# Patient Record
Sex: FEMALE | Race: WHITE | NOT HISPANIC OR LATINO | Employment: OTHER | URBAN - METROPOLITAN AREA
[De-identification: names, ages, dates, MRNs, and addresses within clinical notes are randomized per-mention and may not be internally consistent; named-entity substitution may affect disease eponyms.]

---

## 2023-06-08 PROBLEM — F41.1 GENERALIZED ANXIETY DISORDER: Status: ACTIVE | Noted: 2023-06-08

## 2023-07-17 PROBLEM — E78.2 HYPERLIPIDEMIA, MIXED: Status: ACTIVE | Noted: 2023-07-17

## 2023-09-12 ENCOUNTER — APPOINTMENT (OUTPATIENT)
Dept: PRIMARY CARE | Facility: CLINIC | Age: 73
End: 2023-09-12
Payer: MEDICARE

## 2023-10-02 ENCOUNTER — OFFICE VISIT (OUTPATIENT)
Dept: PRIMARY CARE | Facility: CLINIC | Age: 73
End: 2023-10-02
Payer: MEDICARE

## 2023-10-02 VITALS
SYSTOLIC BLOOD PRESSURE: 112 MMHG | OXYGEN SATURATION: 95 % | TEMPERATURE: 97.3 F | WEIGHT: 164 LBS | BODY MASS INDEX: 28 KG/M2 | HEART RATE: 92 BPM | HEIGHT: 64 IN | DIASTOLIC BLOOD PRESSURE: 70 MMHG

## 2023-10-02 DIAGNOSIS — E78.2 HYPERLIPIDEMIA, MIXED: ICD-10-CM

## 2023-10-02 DIAGNOSIS — I26.99 PULMONARY EMBOLISM, UNSPECIFIED CHRONICITY, UNSPECIFIED PULMONARY EMBOLISM TYPE, UNSPECIFIED WHETHER ACUTE COR PULMONALE PRESENT (MULTI): Primary | ICD-10-CM

## 2023-10-02 DIAGNOSIS — F32.A DEPRESSION, UNSPECIFIED DEPRESSION TYPE: ICD-10-CM

## 2023-10-02 DIAGNOSIS — D64.81 ANTINEOPLASTIC CHEMOTHERAPY INDUCED ANEMIA: ICD-10-CM

## 2023-10-02 DIAGNOSIS — G25.81 RESTLESS LEG SYNDROME: ICD-10-CM

## 2023-10-02 DIAGNOSIS — C85.10 B-CELL LYMPHOMA, UNSPECIFIED B-CELL LYMPHOMA TYPE, UNSPECIFIED BODY REGION (MULTI): ICD-10-CM

## 2023-10-02 DIAGNOSIS — D70.9 NEUTROPENIA, UNSPECIFIED TYPE (CMS-HCC): ICD-10-CM

## 2023-10-02 DIAGNOSIS — T45.1X5A ANTINEOPLASTIC CHEMOTHERAPY INDUCED ANEMIA: ICD-10-CM

## 2023-10-02 PROCEDURE — 1125F AMNT PAIN NOTED PAIN PRSNT: CPT | Performed by: INTERNAL MEDICINE

## 2023-10-02 PROCEDURE — 99214 OFFICE O/P EST MOD 30 MIN: CPT | Performed by: INTERNAL MEDICINE

## 2023-10-02 PROCEDURE — 1159F MED LIST DOCD IN RCRD: CPT | Performed by: INTERNAL MEDICINE

## 2023-10-02 NOTE — PROGRESS NOTES
"Subjective   Patient ID: Vianey Aguilar is a 73 y.o. female who presents for the following    Chronic disease follow up    MEDICARE WELLNESS Feb 2023    Assessment/Plan   b-cell lymphoma stage III: stable, following with dr jameson on a regular basis.      chemotherapy induced anemia and neutropenia: stable, receiving retacrit and neuclast      cancer related pain: Patient following with palliative care no longer getting percocet and a fentanyl patch      depression: stable on lexapro     Left hand deputryn contracture      RLS/neuropathy: stable, patient can take tylenol as needed. She never tried gabapentin       Irritable bowel syndrome : unclear origin if chemo induced vs irritable bowel. lets do \"align\" also just imodium as needed     hx of pulmonary embolism: stable, continue on eliquis        mammogram screening u       HPI   female here for the following      jose manuel and daughter is geena      b-cell lymphoma stage III. Patient has completed in september 2021 R-chop . she follows with dr jameson on a regular basis. right upper chest port, is now removed.      general swelling likely secondary to chemo. ef 65% september 2021      chemotherapy induced anemia and neutropenia: not requiring hormone treatment for this. Just watching.         mild depression: patient is on zoloft and xanax. she gets the refills by palliative care. she says that her anxiety does not get helped with xanax as it causes her to become tired. she was stopped on Zoloft and started patient on lexpro. she continues to do well.      diarrhea: unclear origin if chemo induced vs irritable bowel. she takes cholestyramine and imodium. she describes her stools normal in the am. but as day progresses she gets \"gas\" and pieces and water occurs. patient eating a lot of sauerkraut as a natural way to bulk her stool. patient tid see dr evangelista and is doing much better with cholestyramine METAMUCIL AND IMODIUM     RLS/neuropathy: pain in her lower " "extremities especially at night.     hx of Pulmonary embolism: after patient's third chemo patient was found to have a small blood clot. she is NO LONGER ON eliquis.     she likes a holistic and natural approach to her health care.      social: patient denies any smoking, drug or alcohol abuse     family history: brother alive and 84 yo, sister passed from breast cancer     surgical history: 2011 right ankle surgery fracture repair.        Visit Vitals  /70 (BP Location: Right arm, Patient Position: Sitting)   Pulse 92   Temp 36.3 °C (97.3 °F)   Ht 1.626 m (5' 4\")   Wt 74.4 kg (164 lb)   SpO2 95%   BMI 28.15 kg/m²   Smoking Status Never   BSA 1.83 m²     PHYSICAL EXAM   General appearance: Alert and in no acute distress. speech is clear and coherent  HEENT: Sclera and conjunctiva white, EOMI,     Respiratory : No respiratory distress, normal respiratory rhythm and effort. Clear bilateral breath sounds. No wheezing or rhonchi.   Cardiovascular: heart rate regular, S1, S2. no murmurs. no Lower extremity edema  Skin inspection: Normal skin color and pigmentation, normal skin turgor and no visible rash, induration, or cellulitis  MSK: 5/5 strength upper and lower extremities without gait abnormalities. no loss of muscle mass   Neuro: 2-12 CN grossly intact.  no slurred speech. no lateralizing deficit  Psychiatric Orientation: Oriented to person, place, and time. no depression, homicidal or suicidal thoughts, normal affect       REVIEW OF SYSTEMS   Constitutional: not feeling tired and no fever,    Cardiovascular: no exertional chest pain, no palpitations, no lower extremity edema    Lungs: Denies shortness of breath, exertional dyspnea, wheezing  Gastrointestinal: no change in bowel habits, no diarrhea, no nausea, no vomiting and no abdominal pain.      Neurological: no headaches, no seizures, no numbness, no lateralizing deficits and no fainting.   Psychiatric: no depression and no anxiety.   Urine: denies " polyuria, hematuria, dysuria        No Known Allergies    Current Outpatient Medications   Medication Sig Dispense Refill    cholestyramine (Questran) 4 gram packet MIX THE CONTENTS OF 2 POWDER PACKETS WITH 6 OUNCES OF NON CARBONATED BEVERAGE AND DRINK AND SWALLOW ONCE DAILY 180 packet 3    escitalopram (Lexapro) 10 mg tablet TAKE 1 TABLET DAILY 90 tablet 3     No current facility-administered medications for this visit.       Objective     No visits with results within 4 Month(s) from this visit.   Latest known visit with results is:   Legacy Encounter on 03/09/2022   Component Date Value Ref Range Status    Ova + Parasite Exam 03/09/2022 Negative  Negative Final    Giardia lamblia Antigen 03/09/2022 Negative  Negative Final    Cryptosporidum Antigen by EIA 03/09/2022 Negative  Negative Final       Radiology: Reviewed imaging in powerchart.  No results found.    No family history on file.  Social History     Socioeconomic History    Marital status:      Spouse name: Not on file    Number of children: Not on file    Years of education: Not on file    Highest education level: Not on file   Occupational History    Not on file   Tobacco Use    Smoking status: Never    Smokeless tobacco: Never   Substance and Sexual Activity    Alcohol use: Never    Drug use: Never    Sexual activity: Not on file   Other Topics Concern    Not on file   Social History Narrative    Not on file     Social Determinants of Health     Financial Resource Strain: Not on file   Food Insecurity: Not on file   Transportation Needs: Not on file   Physical Activity: Not on file   Stress: Not on file   Social Connections: Not on file   Intimate Partner Violence: Not on file   Housing Stability: Not on file     Past Medical History:   Diagnosis Date    Body mass index (BMI) 29.0-29.9, adult 11/12/2020    Body mass index (BMI) of 29.0 to 29.9 in adult    Body mass index (BMI)30.0-30.9, adult 12/05/2019    Body mass index (BMI) of 30.0 to 30.9 in  adult    Encounter for general adult medical examination without abnormal findings 11/12/2020    Medicare annual wellness visit, subsequent    Personal history of non-Hodgkin lymphomas 02/22/2022    History of non-Hodgkin's lymphoma    Personal history of other diseases of the nervous system and sense organs     History of carpal tunnel syndrome    Personal history of other diseases of urinary system     History of hematuria    Personal history of other malignant neoplasm of large intestine 02/22/2022    History of other malignant neoplasm of large intestine    Personal history of other malignant neoplasm of large intestine 02/22/2022    History of other malignant neoplasm of large intestine    Personal history of urinary calculi     Personal history of renal calculi    Procedure and treatment not carried out because of patient's decision for unspecified reasons 04/14/2021    Colonoscopy refused     Past Surgical History:   Procedure Laterality Date    ANKLE ARTHROSCOPY W/ OPEN REPAIR  08/24/2015    Ankle Repair    OTHER SURGICAL HISTORY  07/29/2021    Breast biopsy    OTHER SURGICAL HISTORY  07/29/2021    Colonoscopy       Charting was completed using voice recognition technology and may include unintended errors.

## 2023-11-01 LAB
NON-UH HIE CALCULATED LDL CHOLESTEROL: 104 MG/DL (ref 60–130)
NON-UH HIE CHOLESTEROL: 207 MG/DL (ref 100–200)
NON-UH HIE HDL CHOLESTEROL: 82 MG/DL (ref 40–60)
NON-UH HIE TOTAL CHOL/HDL CHOL RATIO: 2.5
NON-UH HIE TRIGLYCERIDES: 103 MG/DL (ref 30–150)
NON-UH HIE TSH: 2.31 UIU/ML (ref 0.55–4.78)

## 2024-04-10 ENCOUNTER — OFFICE VISIT (OUTPATIENT)
Dept: PRIMARY CARE | Facility: CLINIC | Age: 74
End: 2024-04-10
Payer: COMMERCIAL

## 2024-04-10 VITALS
OXYGEN SATURATION: 96 % | HEART RATE: 85 BPM | WEIGHT: 165 LBS | SYSTOLIC BLOOD PRESSURE: 160 MMHG | BODY MASS INDEX: 28.17 KG/M2 | DIASTOLIC BLOOD PRESSURE: 80 MMHG | HEIGHT: 64 IN

## 2024-04-10 DIAGNOSIS — Z00.00 WELLNESS EXAMINATION: Primary | ICD-10-CM

## 2024-04-10 DIAGNOSIS — D70.9 NEUTROPENIA, UNSPECIFIED TYPE (CMS-HCC): ICD-10-CM

## 2024-04-10 DIAGNOSIS — F33.0 MAJOR DEPRESSIVE DISORDER, RECURRENT, MILD (CMS-HCC): ICD-10-CM

## 2024-04-10 DIAGNOSIS — G62.2 POLYNEUROPATHY DUE TO OTHER TOXIC AGENTS (MULTI): ICD-10-CM

## 2024-04-10 DIAGNOSIS — C85.10 B-CELL LYMPHOMA, UNSPECIFIED B-CELL LYMPHOMA TYPE, UNSPECIFIED BODY REGION (MULTI): ICD-10-CM

## 2024-04-10 DIAGNOSIS — E43 UNSPECIFIED SEVERE PROTEIN-CALORIE MALNUTRITION (MULTI): ICD-10-CM

## 2024-04-10 DIAGNOSIS — I26.99 PULMONARY EMBOLISM, UNSPECIFIED CHRONICITY, UNSPECIFIED PULMONARY EMBOLISM TYPE, UNSPECIFIED WHETHER ACUTE COR PULMONALE PRESENT (MULTI): ICD-10-CM

## 2024-04-10 PROCEDURE — 99214 OFFICE O/P EST MOD 30 MIN: CPT | Performed by: INTERNAL MEDICINE

## 2024-04-10 PROCEDURE — G0439 PPPS, SUBSEQ VISIT: HCPCS | Performed by: INTERNAL MEDICINE

## 2024-04-10 PROCEDURE — 1170F FXNL STATUS ASSESSED: CPT | Performed by: INTERNAL MEDICINE

## 2024-04-10 PROCEDURE — 1036F TOBACCO NON-USER: CPT | Performed by: INTERNAL MEDICINE

## 2024-04-10 PROCEDURE — 1160F RVW MEDS BY RX/DR IN RCRD: CPT | Performed by: INTERNAL MEDICINE

## 2024-04-10 PROCEDURE — 1159F MED LIST DOCD IN RCRD: CPT | Performed by: INTERNAL MEDICINE

## 2024-04-10 ASSESSMENT — PATIENT HEALTH QUESTIONNAIRE - PHQ9
SUM OF ALL RESPONSES TO PHQ9 QUESTIONS 1 AND 2: 0
2. FEELING DOWN, DEPRESSED OR HOPELESS: NOT AT ALL
1. LITTLE INTEREST OR PLEASURE IN DOING THINGS: NOT AT ALL

## 2024-04-10 ASSESSMENT — ACTIVITIES OF DAILY LIVING (ADL)
DRESSING: INDEPENDENT
BATHING: INDEPENDENT
MANAGING_FINANCES: INDEPENDENT
DOING_HOUSEWORK: INDEPENDENT
GROCERY_SHOPPING: INDEPENDENT
TAKING_MEDICATION: INDEPENDENT

## 2024-04-10 NOTE — PROGRESS NOTES
"Subjective   Patient ID: Vianey Aguilar is a 73 y.o. female who presents for the following    Chronic disease follow up and     MEDICARE WELLNESS  April 2024     Assessment/Plan   b-cell lymphoma stage III: stable, following with  Zoila TAYLOR [facility] on a regular basis.      chemotherapy induced anemia and neutropenia: stable,       Hx of cancer related pain: Patient following with palliative care no longer getting percocet and a fentanyl patch       Left hand deputryn contracture      RLS/neuropathy: stable, patient can take tylenol as needed. She never tried gabapentin       Irritable bowel syndrome : unclear origin if chemo induced vs irritable bowel. lets do \"align\" also just imodium as needed     hx of pulmonary embolism: stable, continue on eliquis       defers mammogram or colon cancer screening       HPI   female here for the following      jose manuel and daughter is geena      b-cell lymphoma stage III. Patient has completed in september 2021 R-chop . she follows with dr jameson on a regular basis. right upper chest port, is now removed.      general swelling likely secondary to chemo. ef 65% september 2021      chemotherapy induced anemia and neutropenia: not requiring hormone treatment for this. Just watching.         Major depressive disorder: patient is on zoloft and xanax. she gets the refills by palliative care. she says that her anxiety does not get helped with xanax as it causes her to become tired. she was stopped on Zoloft and started patient on lexpro. she continues to do well.      Irritable bowel disease diarrhea: unclear origin if chemo induced vs irritable bowel. she takes cholestyramine and imodium. she describes her stools normal in the am. but as day progresses she gets \"gas\" and pieces and water occurs. patient eating a lot of sauerkraut as a natural way to bulk her stool. patient tid see dr evangelista and is doing much better with cholestyramine METAMUCIL AND IMODIUM has helped " "quite a bit.      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     hx of Pulmonary embolism: after patient's third chemo patient was found to have a small blood clot. she is NO LONGER ON eliquis.     she likes a holistic and natural approach to her health care.      social: patient denies any smoking, drug or alcohol abuse     family history: brother alive and 86 yo, sister passed from breast cancer     surgical history: 2011 right ankle surgery fracture repair.        Visit Vitals  /80 (BP Location: Left arm, Patient Position: Sitting, BP Cuff Size: Adult)   Pulse 85   Ht 1.626 m (5' 4\")   Wt 74.8 kg (165 lb)   SpO2 96%   BMI 28.32 kg/m²   Smoking Status Never   BSA 1.84 m²     PHYSICAL EXAM   General appearance: Alert and in no acute distress. speech is clear and coherent  HEENT: Sclera and conjunctiva white, EOMI,     Respiratory : No respiratory distress, normal respiratory rhythm and effort. Clear bilateral breath sounds. No wheezing or rhonchi.   Cardiovascular: heart rate regular, S1, S2. no murmurs. no Lower extremity edema  Skin inspection: Normal skin color and pigmentation, normal skin turgor and no visible rash, induration, or cellulitis  MSK: 5/5 strength upper and lower extremities without gait abnormalities. no loss of muscle mass   Neuro: 2-12 CN grossly intact.  no slurred speech. no lateralizing deficit  Psychiatric Orientation: Oriented to person, place, and time. no depression, homicidal or suicidal thoughts, normal affect       REVIEW OF SYSTEMS   Constitutional: not feeling tired and no fever,    Cardiovascular: no exertional chest pain, no palpitations, slight lower extremity edema    Lungs: Denies shortness of breath, exertional dyspnea, wheezing  Gastrointestinal: no change in bowel habits, no diarrhea, no nausea, no vomiting and no abdominal pain.      Neurological: no lateralizing deficits and no fainting.   Psychiatric: no " depression and no anxiety.   Urine: denies polyuria, hematuria, dysuria        Allergies   Allergen Reactions    Ciprofloxacin Itching    Penicillins Hives    Sulfamethoxazole-Trimethoprim Itching       Current Outpatient Medications   Medication Sig Dispense Refill    escitalopram (Lexapro) 10 mg tablet TAKE 1 TABLET DAILY 90 tablet 3    cholestyramine (Questran) 4 gram packet MIX THE CONTENTS OF 2 POWDER PACKETS WITH 6 OUNCES OF NON CARBONATED BEVERAGE AND DRINK AND SWALLOW ONCE DAILY (Patient not taking: Reported on 4/10/2024) 180 packet 3     No current facility-administered medications for this visit.       Objective     No visits with results within 4 Month(s) from this visit.   Latest known visit with results is:   Orders Only on 11/01/2023   Component Date Value Ref Range Status    [facility] Total Chol/HDL Chol Rat* 11/01/2023 2.5   Final    [facility] Triglycerides 11/01/2023 103  30 - 150 mg/dL Final    [facility] Cholesterol 11/01/2023 207 (H)  100 - 200 mg/dL Final    [facility] Calculated LDL Choleste* 11/01/2023 104  60 - 130 mg/dL Final    [facility] HDL Cholesterol 11/01/2023 82 (H)  40 - 60 mg/dL Final    [facility] TSH 11/01/2023 2.31  0.55 - 4.78 uIU/ml Final       Radiology: Reviewed imaging in powerchart.  No results found.    No family history on file.  Social History     Socioeconomic History    Marital status:      Spouse name: None    Number of children: None    Years of education: None    Highest education level: None   Occupational History    None   Tobacco Use    Smoking status: Never    Smokeless tobacco: Never   Substance and Sexual Activity    Alcohol use: Never    Drug use: Never    Sexual activity: None   Other Topics Concern    None   Social History Narrative    None     Social Determinants of Health     Financial Resource Strain: Not on file   Food Insecurity: Not on file   Transportation Needs: Not on file   Physical Activity: Not on file   Stress: Not on file   Social  Connections: Not on file   Intimate Partner Violence: Not on file   Housing Stability: Not on file     Past Medical History:   Diagnosis Date    Body mass index (BMI) 29.0-29.9, adult 11/12/2020    Body mass index (BMI) of 29.0 to 29.9 in adult    Body mass index (BMI)30.0-30.9, adult 12/05/2019    Body mass index (BMI) of 30.0 to 30.9 in adult    Encounter for general adult medical examination without abnormal findings 11/12/2020    Medicare annual wellness visit, subsequent    Personal history of non-Hodgkin lymphomas 02/22/2022    History of non-Hodgkin's lymphoma    Personal history of other diseases of the nervous system and sense organs     History of carpal tunnel syndrome    Personal history of other diseases of urinary system     History of hematuria    Personal history of other malignant neoplasm of large intestine 02/22/2022    History of other malignant neoplasm of large intestine    Personal history of other malignant neoplasm of large intestine 02/22/2022    History of other malignant neoplasm of large intestine    Personal history of urinary calculi     Personal history of renal calculi    Procedure and treatment not carried out because of patient's decision for unspecified reasons 04/14/2021    Colonoscopy refused     Past Surgical History:   Procedure Laterality Date    ANKLE ARTHROSCOPY W/ OPEN REPAIR  08/24/2015    Ankle Repair    OTHER SURGICAL HISTORY  07/29/2021    Breast biopsy    OTHER SURGICAL HISTORY  07/29/2021    Colonoscopy       Charting was completed using voice recognition technology and may include unintended errors.

## 2024-05-12 ENCOUNTER — HOSPITAL ENCOUNTER (OUTPATIENT)
Dept: RADIOLOGY | Facility: EXTERNAL LOCATION | Age: 74
Discharge: HOME | End: 2024-05-12
Payer: MEDICARE

## 2024-05-12 DIAGNOSIS — S49.91XA INJURY OF HUMERUS, RIGHT, INITIAL ENCOUNTER: ICD-10-CM

## 2024-05-12 DIAGNOSIS — S49.91XA SHOULDER INJURY, RIGHT, INITIAL ENCOUNTER: ICD-10-CM

## 2024-07-02 ENCOUNTER — TELEPHONE (OUTPATIENT)
Dept: PRIMARY CARE | Facility: CLINIC | Age: 74
End: 2024-07-02
Payer: MEDICARE

## 2024-07-02 DIAGNOSIS — F41.1 GENERALIZED ANXIETY DISORDER: ICD-10-CM

## 2024-07-02 RX ORDER — ESCITALOPRAM OXALATE 10 MG/1
10 TABLET ORAL DAILY
Qty: 90 TABLET | Refills: 3 | Status: SHIPPED | OUTPATIENT
Start: 2024-07-02

## 2024-10-15 ENCOUNTER — APPOINTMENT (OUTPATIENT)
Dept: PRIMARY CARE | Facility: CLINIC | Age: 74
End: 2024-10-15
Payer: COMMERCIAL

## 2024-12-11 ENCOUNTER — APPOINTMENT (OUTPATIENT)
Dept: PRIMARY CARE | Facility: CLINIC | Age: 74
End: 2024-12-11
Payer: COMMERCIAL

## 2024-12-11 VITALS
BODY MASS INDEX: 28.28 KG/M2 | HEIGHT: 66 IN | SYSTOLIC BLOOD PRESSURE: 146 MMHG | HEART RATE: 80 BPM | DIASTOLIC BLOOD PRESSURE: 74 MMHG | OXYGEN SATURATION: 97 % | WEIGHT: 176 LBS

## 2024-12-11 DIAGNOSIS — F32.A DEPRESSION, UNSPECIFIED DEPRESSION TYPE: ICD-10-CM

## 2024-12-11 DIAGNOSIS — C85.10 B-CELL LYMPHOMA, UNSPECIFIED B-CELL LYMPHOMA TYPE, UNSPECIFIED BODY REGION (MULTI): Primary | ICD-10-CM

## 2024-12-11 PROCEDURE — 99213 OFFICE O/P EST LOW 20 MIN: CPT | Performed by: INTERNAL MEDICINE

## 2024-12-11 PROCEDURE — 1123F ACP DISCUSS/DSCN MKR DOCD: CPT | Performed by: INTERNAL MEDICINE

## 2024-12-11 PROCEDURE — 1159F MED LIST DOCD IN RCRD: CPT | Performed by: INTERNAL MEDICINE

## 2024-12-11 PROCEDURE — 1036F TOBACCO NON-USER: CPT | Performed by: INTERNAL MEDICINE

## 2024-12-11 NOTE — PROGRESS NOTES
"Subjective   Patient ID: Vianey Aguilar is a 74 y.o. female who presents for the following    Chronic disease follow up      MEDICARE WELLNESS  April 2024     Assessment/Plan   b-cell lymphoma stage III: stable, following with  Zoila TAYLOR [facility] on a regular basis.      chemotherapy induced anemia and neutropenia: stable,       Hx of cancer related pain: Patient following with palliative care no longer getting percocet and a fentanyl patch       Left hand deputryn contracture      RLS/neuropathy: stable, patient can take tylenol as needed. She never tried gabapentin       Irritable bowel syndrome : unclear origin if chemo induced vs irritable bowel. lets do \"align\" also just imodium as needed     hx of pulmonary embolism: stable, continue on eliquis       defers mammogram or colon cancer screening    Patient has lab work from April 2024 to still get done.  j       HPI   female here for the following      jose manuel and daughter is geena      b-cell lymphoma stage III. Patient has completed in september 2021 R-chop . She has been cancer free for some time now. She is regularly following up with Zoila TAYLOR at [facility].         Major depressive disorder: patient is on zoloft and xanax. she gets the refills by palliative care. she says that her anxiety does not get helped with xanax as it causes her to become tired. she was stopped on Zoloft and started patient on lexpro. she continues to do well. Patient just does not feel bad any more.      Irritable bowel disease diarrhea: unclear origin if chemo induced vs irritable bowel. she takes cholestyramine and imodium. she describes her stools normal in the am. but as day progresses she gets \"gas\" and pieces and water occurs. patient eating a lot of sauerkraut as a natural way to bulk her stool. patient tid see dr evangelista and is doing much better with cholestyramine METAMUCIL AND IMODIUM has helped quite a bit.      RLS/polyneuropathy due to " "other toxic agents : stable, pain in her lower extremities especially at night.     hx of Pulmonary embolism: after patient's third chemo patient was found to have a small blood clot. she is NO LONGER ON eliquis.     she likes a holistic and natural approach to her health care.      social: patient denies any smoking, drug or alcohol abuse     family history: brother alive and 86 yo, sister passed from breast cancer     surgical history: 2011 right ankle surgery fracture repair.        Visit Vitals  /74 (BP Location: Right arm, Patient Position: Sitting, BP Cuff Size: Adult)   Pulse 80   Ht 1.676 m (5' 6\")   Wt 79.8 kg (176 lb)   SpO2 97%   BMI 28.41 kg/m²   Smoking Status Never   BSA 1.93 m²     PHYSICAL EXAM   General appearance: Alert and in no acute distress. speech is clear and coherent  HEENT: Sclera and conjunctiva white, EOMI,     Respiratory : No respiratory distress, normal respiratory rhythm and effort. Clear bilateral breath sounds. No wheezing or rhonchi.   Cardiovascular: heart rate regular, S1, S2. no murmurs. no Lower extremity edema   MSK: 5/5 strength upper and lower extremities without gait abnormalities. no loss of muscle mass   Neuro: 2-12 CN grossly intact.  no slurred speech. no lateralizing deficit  Psychiatric Orientation: Oriented to person, place, and time. no depression, homicidal or suicidal thoughts, normal affect       REVIEW OF SYSTEMS   Constitutional: not feeling tired and no fever,    Cardiovascular: no exertional chest pain, no palpitations, slight lower extremity edema    Lungs: Denies shortness of breath, exertional dyspnea, wheezing  Gastrointestinal: no change in bowel habits, no diarrhea, no nausea, no vomiting and no abdominal pain.      Neurological: no lateralizing deficits and no fainting.   Psychiatric: no depression and no anxiety.        Allergies   Allergen Reactions    Ciprofloxacin Itching    Penicillins Hives    Sulfamethoxazole-Trimethoprim Itching "       Current Outpatient Medications   Medication Sig Dispense Refill    escitalopram (Lexapro) 10 mg tablet Take 1 tablet (10 mg) by mouth once daily. 90 tablet 3    cholestyramine (Questran) 4 gram packet MIX THE CONTENTS OF 2 POWDER PACKETS WITH 6 OUNCES OF NON CARBONATED BEVERAGE AND DRINK AND SWALLOW ONCE DAILY (Patient not taking: Reported on 12/11/2024) 180 packet 3     No current facility-administered medications for this visit.       Objective     No visits with results within 4 Month(s) from this visit.   Latest known visit with results is:   Orders Only on 11/01/2023   Component Date Value Ref Range Status    [facility] Total Chol/HDL Chol Rat* 11/01/2023 2.5   Final    [facility] Triglycerides 11/01/2023 103  30 - 150 mg/dL Final    [facility] Cholesterol 11/01/2023 207 (H)  100 - 200 mg/dL Final    [facility] Calculated LDL Choleste* 11/01/2023 104  60 - 130 mg/dL Final    [facility] HDL Cholesterol 11/01/2023 82 (H)  40 - 60 mg/dL Final    [facility] TSH 11/01/2023 2.31  0.55 - 4.78 uIU/ml Final       Radiology: Reviewed imaging in powerchart.  No results found.    No family history on file.  Social History     Socioeconomic History    Marital status:    Tobacco Use    Smoking status: Never    Smokeless tobacco: Never   Substance and Sexual Activity    Alcohol use: Never    Drug use: Never     Past Medical History:   Diagnosis Date    Body mass index (BMI) 29.0-29.9, adult 11/12/2020    Body mass index (BMI) of 29.0 to 29.9 in adult    Body mass index (BMI)30.0-30.9, adult 12/05/2019    Body mass index (BMI) of 30.0 to 30.9 in adult    Encounter for general adult medical examination without abnormal findings 11/12/2020    Medicare annual wellness visit, subsequent    Personal history of non-Hodgkin lymphomas 02/22/2022    History of non-Hodgkin's lymphoma    Personal history of other diseases of the nervous system and sense organs     History of carpal tunnel syndrome    Personal history of  other diseases of urinary system     History of hematuria    Personal history of other malignant neoplasm of large intestine 02/22/2022    History of other malignant neoplasm of large intestine    Personal history of other malignant neoplasm of large intestine 02/22/2022    History of other malignant neoplasm of large intestine    Personal history of urinary calculi     Personal history of renal calculi    Procedure and treatment not carried out because of patient's decision for unspecified reasons 04/14/2021    Colonoscopy refused     Past Surgical History:   Procedure Laterality Date    ANKLE ARTHROSCOPY W/ OPEN REPAIR  08/24/2015    Ankle Repair    OTHER SURGICAL HISTORY  07/29/2021    Breast biopsy    OTHER SURGICAL HISTORY  07/29/2021    Colonoscopy       Charting was completed using voice recognition technology and may include unintended errors.

## 2025-06-11 ENCOUNTER — APPOINTMENT (OUTPATIENT)
Dept: PRIMARY CARE | Facility: CLINIC | Age: 75
End: 2025-06-11
Payer: COMMERCIAL

## 2025-06-25 ENCOUNTER — APPOINTMENT (OUTPATIENT)
Dept: PRIMARY CARE | Facility: CLINIC | Age: 75
End: 2025-06-25
Payer: COMMERCIAL

## 2025-07-16 ENCOUNTER — APPOINTMENT (OUTPATIENT)
Dept: PRIMARY CARE | Facility: CLINIC | Age: 75
End: 2025-07-16
Payer: COMMERCIAL

## 2025-07-16 VITALS
HEIGHT: 66 IN | OXYGEN SATURATION: 95 % | BODY MASS INDEX: 28.45 KG/M2 | HEART RATE: 85 BPM | SYSTOLIC BLOOD PRESSURE: 145 MMHG | WEIGHT: 177 LBS | DIASTOLIC BLOOD PRESSURE: 83 MMHG

## 2025-07-16 DIAGNOSIS — E55.9 VITAMIN D DEFICIENCY: ICD-10-CM

## 2025-07-16 DIAGNOSIS — C83.38 DIFFUSE LARGE B-CELL LYMPHOMA, LYMPH NODES OF MULTIPLE SITES (MULTI): ICD-10-CM

## 2025-07-16 DIAGNOSIS — Z00.00 ANNUAL PHYSICAL EXAM: ICD-10-CM

## 2025-07-16 DIAGNOSIS — E78.2 HYPERLIPIDEMIA, MIXED: ICD-10-CM

## 2025-07-16 DIAGNOSIS — D64.81 ANTINEOPLASTIC CHEMOTHERAPY INDUCED ANEMIA: ICD-10-CM

## 2025-07-16 DIAGNOSIS — T45.1X5A ANTINEOPLASTIC CHEMOTHERAPY INDUCED ANEMIA: ICD-10-CM

## 2025-07-16 DIAGNOSIS — C85.10 B-CELL LYMPHOMA, UNSPECIFIED B-CELL LYMPHOMA TYPE, UNSPECIFIED BODY REGION (MULTI): ICD-10-CM

## 2025-07-16 DIAGNOSIS — Z00.00 WELLNESS EXAMINATION: Primary | ICD-10-CM

## 2025-07-16 DIAGNOSIS — K21.9 GASTROESOPHAGEAL REFLUX DISEASE WITHOUT ESOPHAGITIS: ICD-10-CM

## 2025-07-16 PROCEDURE — 1036F TOBACCO NON-USER: CPT | Performed by: INTERNAL MEDICINE

## 2025-07-16 PROCEDURE — G0439 PPPS, SUBSEQ VISIT: HCPCS | Performed by: INTERNAL MEDICINE

## 2025-07-16 PROCEDURE — 1123F ACP DISCUSS/DSCN MKR DOCD: CPT | Performed by: INTERNAL MEDICINE

## 2025-07-16 PROCEDURE — 99397 PER PM REEVAL EST PAT 65+ YR: CPT | Performed by: INTERNAL MEDICINE

## 2025-07-16 PROCEDURE — 1160F RVW MEDS BY RX/DR IN RCRD: CPT | Performed by: INTERNAL MEDICINE

## 2025-07-16 PROCEDURE — 1170F FXNL STATUS ASSESSED: CPT | Performed by: INTERNAL MEDICINE

## 2025-07-16 PROCEDURE — 1159F MED LIST DOCD IN RCRD: CPT | Performed by: INTERNAL MEDICINE

## 2025-07-16 RX ORDER — OMEPRAZOLE 20 MG/1
20 CAPSULE, DELAYED RELEASE ORAL DAILY
Qty: 90 CAPSULE | Refills: 3 | Status: SHIPPED | OUTPATIENT
Start: 2025-07-16 | End: 2026-01-12

## 2025-07-16 ASSESSMENT — PATIENT HEALTH QUESTIONNAIRE - PHQ9
1. LITTLE INTEREST OR PLEASURE IN DOING THINGS: NOT AT ALL
2. FEELING DOWN, DEPRESSED OR HOPELESS: NOT AT ALL
SUM OF ALL RESPONSES TO PHQ9 QUESTIONS 1 AND 2: 0

## 2025-07-16 ASSESSMENT — ACTIVITIES OF DAILY LIVING (ADL)
MANAGING_FINANCES: INDEPENDENT
DRESSING: INDEPENDENT
GROCERY_SHOPPING: INDEPENDENT
TAKING_MEDICATION: INDEPENDENT
DOING_HOUSEWORK: INDEPENDENT
BATHING: INDEPENDENT

## 2025-07-16 NOTE — PROGRESS NOTES
"Subjective   Patient ID: Vianey Aguilar is a 75 y.o. female who presents for the following       MEDICARE WELLNESS  7/16/25 and PHYSICAL 7/16/25  Assessment/Plan   b-cell lymphoma stage III: stable, following with  Zoila TAYLOR [facility] on a regular basis.      chemotherapy induced anemia and neutropenia: stable,      Depression: stable on lexapro      Hx of cancer related pain: Patient following with palliative care no longer getting percocet and a fentanyl patch        RLS/neuropathy: stable, patient can take tylenol as needed. She never tried gabapentin       Irritable bowel syndrome : unclear origin if chemo induced vs irritable bowel. lets do \"align\" also just imodium as needed     hx of pulmonary embolism (from cancer related): stable, no longer on blood thinners      defers mammogram or colon cancer screening          HPI   female here for the following      jose manuel and daughter is geena      b-cell lymphoma stage III. Patient has completed in september 2021 R-chop . She has been cancer free for some time now. She is regularly following up with Zoila TAYLOR at [facility].         Major depressive disorder: patient is on zoloft and xanax. she gets the refills by palliative care. she says that her anxiety does not get helped with xanax as it causes her to become tired. she was stopped on Zoloft and started patient on lexpro. she continues to do well. Patient just does not feel bad any more.      Irritable bowel disease diarrhea: unclear origin if chemo induced vs irritable bowel. she takes cholestyramine and imodium. she describes her stools normal in the am. but as day progresses she gets \"gas\" and pieces and water occurs. patient eating a lot of sauerkraut as a natural way to bulk her stool. patient tid see dr evangelista and is doing much better with cholestyramine METAMUCIL AND IMODIUM has helped quite a bit.      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     hx of Pulmonary embolism: after patient's third chemo patient was found to have a small blood clot. she is NO LONGER ON eliquis.     she likes a holistic and natural approach to her health care.      social: patient denies any smoking, drug or alcohol abuse     family history: brother alive and 84 yo, sister passed from breast cancer     surgical history: 2011 right ankle surgery fracture repair.        Visit Vitals  /83 (BP Location: Right arm, Patient Position: Sitting, BP Cuff Size: Adult)   Pulse 85   Ht 1.676 m (5' 6\")   Wt 80.3 kg (177 lb)   SpO2 95%   BMI 28.57 kg/m²   Smoking Status Never   BSA 1.93 m²     PHYSICAL EXAM   General appearance: Alert and in no acute distress. speech is clear and coherent  HEENT: Sclera and conjunctiva white, EOMI, uvela midline, no mouth lesions. PERRLA,  nasal turbinates are not swollen without exudate. TM's Aviles with cone of light, external ear canal with scant cerumen. No head trauma  Neck: no carotid bruits or thyromegaly. no lymphadenopathy   Respiratory : No respiratory distress, normal respiratory rhythm and effort. Clear bilateral breath sounds. No wheezing or rhonchi.   Cardiovascular: heart rate regular, S1, S2. no murmurs. no Lower extremity edema  Skin inspection: Normal skin color and pigmentation, normal skin turgor and no visible rash, induration, or cellulitis  MSK: 5/5 strength upper and lower extremities without gait abnormalities. no loss of muscle mass   Neuro: 2-12 CN grossly intact.  no slurred speech. no lateralizing deficit  Psychiatric Orientation: Oriented to person, place, and time. no depression, homicidal or suicidal thoughts, normal affect  Abdomen: soft, none tender, none distended. no organomegaly        REVIEW OF SYSTEMS   General appearance: Alert and in no acute distress. speech is clear and coherent  HEENT: Sclera and conjunctiva white, EOMI, uvela midline, no mouth lesions. PERRLA,  nasal turbinates " are not swollen without exudate. TM's Avlies with cone of light, external ear canal with scant cerumen. No head trauma  Neck: no carotid bruits or thyromegaly. no lymphadenopathy   Respiratory : No respiratory distress, normal respiratory rhythm and effort. Clear bilateral breath sounds. No wheezing or rhonchi.   Cardiovascular: heart rate regular, S1, S2. no murmurs. no Lower extremity edema  Skin inspection: Normal skin color and pigmentation, normal skin turgor and no visible rash, induration, or cellulitis  MSK: 5/5 strength upper and lower extremities without gait abnormalities. no loss of muscle mass   Neuro: 2-12 CN grossly intact.  no slurred speech. no lateralizing deficit  Psychiatric Orientation: Oriented to person, place, and time. no depression, homicidal or suicidal thoughts, normal affect  Abdomen: soft, none tender, none distended. no organomegaly        Allergies   Allergen Reactions    Ciprofloxacin Itching    Penicillins Hives    Sulfamethoxazole-Trimethoprim Itching       Current Outpatient Medications   Medication Sig Dispense Refill    escitalopram (Lexapro) 10 mg tablet Take 1 tablet (10 mg) by mouth once daily. 90 tablet 3     No current facility-administered medications for this visit.       Objective     No visits with results within 4 Month(s) from this visit.   Latest known visit with results is:   Orders Only on 11/01/2023   Component Date Value Ref Range Status    [facility] Total Chol/HDL Chol Rat* 11/01/2023 2.5   Final    [facility] Triglycerides 11/01/2023 103  30 - 150 mg/dL Final    [facility] Cholesterol 11/01/2023 207 (H)  100 - 200 mg/dL Final    [facility] Calculated LDL Choleste* 11/01/2023 104  60 - 130 mg/dL Final    [facility] HDL Cholesterol 11/01/2023 82 (H)  40 - 60 mg/dL Final    [facility] TSH 11/01/2023 2.31  0.55 - 4.78 uIU/ml Final       Radiology: Reviewed imaging in powerchart.  No results found.    No family history on file.  Social History     Socioeconomic  History    Marital status:    Tobacco Use    Smoking status: Never    Smokeless tobacco: Never   Substance and Sexual Activity    Alcohol use: Never    Drug use: Never     Past Medical History:   Diagnosis Date    Body mass index (BMI) 29.0-29.9, adult 11/12/2020    Body mass index (BMI) of 29.0 to 29.9 in adult    Body mass index (BMI)30.0-30.9, adult 12/05/2019    Body mass index (BMI) of 30.0 to 30.9 in adult    Encounter for general adult medical examination without abnormal findings 11/12/2020    Medicare annual wellness visit, subsequent    Personal history of non-Hodgkin lymphomas 02/22/2022    History of non-Hodgkin's lymphoma    Personal history of other diseases of the nervous system and sense organs     History of carpal tunnel syndrome    Personal history of other diseases of urinary system     History of hematuria    Personal history of other malignant neoplasm of large intestine 02/22/2022    History of other malignant neoplasm of large intestine    Personal history of other malignant neoplasm of large intestine 02/22/2022    History of other malignant neoplasm of large intestine    Personal history of urinary calculi     Personal history of renal calculi    Procedure and treatment not carried out because of patient's decision for unspecified reasons 04/14/2021    Colonoscopy refused     Past Surgical History:   Procedure Laterality Date    ANKLE ARTHROSCOPY W/ OPEN REPAIR  08/24/2015    Ankle Repair    OTHER SURGICAL HISTORY  07/29/2021    Breast biopsy    OTHER SURGICAL HISTORY  07/29/2021    Colonoscopy       Charting was completed using voice recognition technology and may include unintended errors.

## 2025-07-29 ENCOUNTER — TELEPHONE (OUTPATIENT)
Dept: PRIMARY CARE | Facility: CLINIC | Age: 75
End: 2025-07-29
Payer: MEDICARE

## 2025-07-29 DIAGNOSIS — F41.1 GENERALIZED ANXIETY DISORDER: ICD-10-CM

## 2025-07-29 RX ORDER — ESCITALOPRAM OXALATE 10 MG/1
10 TABLET ORAL DAILY
Qty: 90 TABLET | Refills: 3 | Status: SHIPPED | OUTPATIENT
Start: 2025-07-29

## 2026-01-21 ENCOUNTER — APPOINTMENT (OUTPATIENT)
Dept: PRIMARY CARE | Facility: CLINIC | Age: 76
End: 2026-01-21
Payer: COMMERCIAL